# Patient Record
(demographics unavailable — no encounter records)

---

## 2025-05-23 NOTE — HISTORY OF PRESENT ILLNESS
[FreeTextEntry1] :  Subjective:   - Summary: Mr. Gomez presents for follow-up of benign prostatic hyperplasia (BPH). He reports frequent urination, interrupted urine flow, and nocturia. The patient has previously consulted with multiple urologists.   - Chief Complaint (CC): Lower urinary tract symptoms associated with BPH   - History of Present Illness (HPI): Mr. Hutchinson reports waking up 3-4 times at night to urinate, which has improved to once per night with Tamsulosin. During the day, he needs to urinate immediately after drinking fluids. Without fluid intake, he can wait 1-2 hours between urinations. He experiences interrupted urine flow and a weak stream. The patient has consulted with three urologists (Dr. Winters, Dr. Scott, and Dr. Valverde) who have not recommended laser surgery. He was referred to me by Dr. Villalba from [place] for a second opinion.   - Past Medical History: Type 2 Diabetes Mellitus (HbA1c: 6.8%)   - Past Surgical History:    - Family History:    - Social History:    - Review of Systems:    - Medications: Tamsulosin 0.4 mg daily   - Allergies:    Objective:   - Diagnostic Results: Previous urological evaluations indicate prostate enlargement and bladder neck obstruction. Post-void residual (PVR) urine volume measured today: 47 cc.   - Vital Signs:    - Physical Examination (PE): Uroflowmetry performed: Patient voided 69 cc with a slow, flat stream. PVR of 47 cc noted.

## 2025-05-23 NOTE — HISTORY OF PRESENT ILLNESS
[FreeTextEntry1] :  Subjective:   - Summary: Mr. Gomez presents for follow-up of benign prostatic hyperplasia (BPH). He reports frequent urination, interrupted urine flow, and nocturia. The patient has previously consulted with multiple urologists.   - Chief Complaint (CC): Lower urinary tract symptoms associated with BPH   - History of Present Illness (HPI): Mr. Hutchinson reports waking up 3-4 times at night to urinate, which has improved to once per night with Tamsulosin. During the day, he needs to urinate immediately after drinking fluids. Without fluid intake, he can wait 1-2 hours between urinations. He experiences interrupted urine flow and a weak stream. The patient has consulted with three urologists (Dr. Winters, Dr. Scott, and Dr. Valverde) who have not recommended laser surgery. He was referred to me by Dr. Villalba from [place] for a second opinion.   - Past Medical History: Type 2 Diabetes Mellitus (HbA1c: 6.8%)   - Past Surgical History:    - Family History:    - Social History:    - Review of Systems:    - Medications: Tamsulosin 0.4 mg daily   - Allergies:    Objective:   - Diagnostic Results: Previous urological evaluations indicate prostate enlargement and bladder neck obstruction. Post-void residual (PVR) urine volume measured today: 47 cc.   - Vital Signs:    - Physical Examination (PE): Uroflowmetry performed: Patient voided 69 cc with a slow, flat stream. PVR of 47 cc noted.

## 2025-05-23 NOTE — ASSESSMENT
[FreeTextEntry1] : Assessment:   - Summary: Mr. Gomez presents with symptomatic benign prostatic hyperplasia (BPH) with bladder outlet obstruction. His prostate is enlarged but not severely so. He has shown some improvement with Tamsulosin but continues to experience bothersome lower urinary tract symptoms.   - Problems:     - Benign Prostatic Hyperplasia (BPH) with bladder outlet obstruction     - Lower Urinary Tract Symptoms (LUTS)     - Type 2 Diabetes Mellitus   - Differential Diagnosis:     - Benign Prostatic Hyperplasia (BPH)     - Overactive Bladder     - Neurogenic Bladder     - Urinary Tract Infection   Plan:   - Summary: After discussing the benefits and limitations of both medical management and surgical intervention, we have decided to optimize medical therapy before considering surgery. I have explained that surgery may improve urine flow but may not necessarily reduce urinary frequency.   - Plan:     - Increase Tamsulosin dosage to 0.8 mg daily (two 0.4 mg tablets)     - Follow-up appointment in 3 months to reassess symptoms and treatment efficacy     - Provide patient education on BPH and managing fluid intake     - Consider urology referral for further evaluation if symptoms worsen or do not improve with increased medication dosage